# Patient Record
Sex: FEMALE | Race: ASIAN | NOT HISPANIC OR LATINO
[De-identification: names, ages, dates, MRNs, and addresses within clinical notes are randomized per-mention and may not be internally consistent; named-entity substitution may affect disease eponyms.]

---

## 2022-04-25 ENCOUNTER — TRANSCRIPTION ENCOUNTER (OUTPATIENT)
Age: 37
End: 2022-04-25

## 2022-04-27 ENCOUNTER — APPOINTMENT (OUTPATIENT)
Dept: ORTHOPEDIC SURGERY | Facility: CLINIC | Age: 37
End: 2022-04-27

## 2022-04-27 PROBLEM — Z00.00 ENCOUNTER FOR PREVENTIVE HEALTH EXAMINATION: Status: ACTIVE | Noted: 2022-04-27

## 2022-04-28 ENCOUNTER — RESULT REVIEW (OUTPATIENT)
Age: 37
End: 2022-04-28

## 2022-04-28 ENCOUNTER — TRANSCRIPTION ENCOUNTER (OUTPATIENT)
Age: 37
End: 2022-04-28

## 2022-04-28 ENCOUNTER — OUTPATIENT (OUTPATIENT)
Dept: OUTPATIENT SERVICES | Facility: HOSPITAL | Age: 37
LOS: 1 days | End: 2022-04-28
Payer: SELF-PAY

## 2022-04-28 ENCOUNTER — APPOINTMENT (OUTPATIENT)
Dept: ORTHOPEDIC SURGERY | Facility: CLINIC | Age: 37
End: 2022-04-28
Payer: MEDICAID

## 2022-04-28 VITALS — WEIGHT: 115 LBS | HEIGHT: 62 IN | BODY MASS INDEX: 21.16 KG/M2

## 2022-04-28 PROCEDURE — 72070 X-RAY EXAM THORAC SPINE 2VWS: CPT

## 2022-04-28 PROCEDURE — 99203 OFFICE O/P NEW LOW 30 MIN: CPT

## 2022-04-28 PROCEDURE — 72082 X-RAY EXAM ENTIRE SPI 2/3 VW: CPT

## 2022-04-28 PROCEDURE — 72082 X-RAY EXAM ENTIRE SPI 2/3 VW: CPT | Mod: 26

## 2022-04-28 RX ORDER — NAPROXEN 250 MG/1
250 TABLET ORAL
Qty: 60 | Refills: 0 | Status: ACTIVE | OUTPATIENT
Start: 2022-04-28
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
## 2022-04-28 NOTE — PHYSICAL EXAM
[de-identified] : GEN: NAD\par Spine: tender with palpation of mid thoracic paraspinal muscles. No step offs \par 5/5 Str BL U and L \par Gait normal [de-identified] : Spine XRs normal

## 2022-05-26 ENCOUNTER — APPOINTMENT (OUTPATIENT)
Dept: ORTHOPEDIC SURGERY | Facility: CLINIC | Age: 37
End: 2022-05-26
Payer: MEDICAID

## 2022-05-26 VITALS
SYSTOLIC BLOOD PRESSURE: 116 MMHG | DIASTOLIC BLOOD PRESSURE: 77 MMHG | OXYGEN SATURATION: 97 % | TEMPERATURE: 97.6 F | HEART RATE: 84 BPM

## 2022-05-26 DIAGNOSIS — M54.50 LOW BACK PAIN, UNSPECIFIED: ICD-10-CM

## 2022-05-26 PROCEDURE — 99213 OFFICE O/P EST LOW 20 MIN: CPT

## 2022-05-27 PROBLEM — M54.50 MIDLINE LOW BACK PAIN, UNSPECIFIED CHRONICITY, UNSPECIFIED WHETHER SCIATICA PRESENT: Status: ACTIVE | Noted: 2022-04-28

## 2022-05-27 RX ORDER — NAPROXEN 500 MG/1
500 TABLET ORAL
Qty: 60 | Refills: 0 | Status: ACTIVE | COMMUNITY
Start: 2022-05-27 | End: 1900-01-01

## 2022-05-27 NOTE — HISTORY OF PRESENT ILLNESS
[de-identified] : 36F here for followup of back pain. She has not been able to attend physical therapy due to an issue with the prescription. She also has not picked up her anti inflammatory medication from pharmacy. She presents here today for renewal of PT script and medication prescription. Reports no change in the back pain. No new radiculopathy or myelopathy symptoms.

## 2022-05-27 NOTE — PHYSICAL EXAM
[de-identified] : GEN: NAD\par Spine: tender with palpation of mid thoracic paraspinal muscles. No step offs \par 5/5 Str BL U and L \par Gait normal  [de-identified] : No new imaging

## 2022-05-27 NOTE — DISCUSSION/SUMMARY
[de-identified] : \par \par All medical record entries made by "residents name" acting as a scribe for this encounter under the direction of "attending physician." I have reviewed the chart and agree that the record accurately reflects my personal performance of the history, physical exam, assessment and plan. I have also personally directed, reviewed and agreed with the chart.\par